# Patient Record
Sex: FEMALE | Race: WHITE | NOT HISPANIC OR LATINO | URBAN - METROPOLITAN AREA
[De-identification: names, ages, dates, MRNs, and addresses within clinical notes are randomized per-mention and may not be internally consistent; named-entity substitution may affect disease eponyms.]

---

## 2024-11-19 ENCOUNTER — LAB REQUISITION (OUTPATIENT)
Dept: DERMATOPATHOLOGY | Facility: CLINIC | Age: 79
End: 2024-11-19
Payer: MEDICARE

## 2024-11-19 DIAGNOSIS — D03.71 MELANOMA IN SITU OF RIGHT LOWER LIMB, INCLUDING HIP (MULTI): ICD-10-CM

## 2024-11-19 PROCEDURE — 88321 CONSLTJ&REPRT SLD PREP ELSWR: CPT | Performed by: DERMATOLOGY

## 2024-11-20 LAB
PATH REPORT.FINAL DX SPEC: NORMAL
PATH REPORT.GROSS SPEC: NORMAL
PATH REPORT.RELEVANT HX SPEC: NORMAL
PATH REPORT.TOTAL CANCER: NORMAL
PATHOLOGY SYNOPTIC REPORT: NORMAL

## 2024-11-24 DIAGNOSIS — C43.9 MELANOMA OF SKIN (MULTI): ICD-10-CM

## 2024-11-24 NOTE — TUMOR BOARD NOTE
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    Recommendation:  WLE with 5 mm margins.    Review Multidisciplinary Cutaneous Oncology Conference recommendation with patient.  Continue routine follow up and total body skin exams with Mayra Sewell.    Follow Up:  Albina Alejandra.      History and Physical Exam  Dermatologic History:   79 y.o. female with a biopsy of the right proximal medial calf on 11/19/2024 showing a melanoma in situ.    She is scheduled for a WLE on 12/18/2024 with Dr. Sewell.      Pathology  Derm Consult: [id number]  Order: [id number]   Collected 11/19/2024 12:19       Status: Final result       Visible to patient: No (inaccessible in [facility])       Dx: Melanoma in situ of right lower limb,...    0 Result Notes      Component    FINAL DIAGNOSIS   3 SLIDES, DERMPATH DIAGNOSTICS [facility], #[id number] (BX: 10/31/2024)     SKIN, RIGHT PROXIMAL MEDIAL CALF, SHAVE BIOPSY:  MELANOMA IN SITU AND DERMAL NEVUS, SEE NOTE.     Note: Microscopic examination reveals a specimen that extends into the superficial dermis. There is an asymmetric proliferation of nested and single melanocytes along the dermal-epidermal junction with pagetoid extension. These melanocytes have moderately enlarged nuclei with moderate to abundant cytoplasm. There are single and nested benign-appearing melanocytes in the dermis. The melanocytes stain with antibodies against SOX-10. Approximately fifty percent of the epidermal melanocytes stain moderately with antibodies against PRAME while the dermal melanocytes do not stain with antibodies against PRAME.      ** Electronically signed out by Lashanda Blackman MD **       Electronically signed by Lashanda Blackman MD on 11/20/2024 at 0749   Case Summary Report   MELANOMA OF THE SKIN: Biopsy   8th Edition - Protocol posted: 3/23/2022MELANOMA OF THE SKIN: BIOPSY - A  SPECIMEN   Procedure  Biopsy, shave   Specimen Laterality  Right   TUMOR   Tumor Site  Skin of lower limb and hip: Right proximal medial calf          Histologic Type  Melanoma in situ, superficial spreading type (low-cumulative sun damage (CSD) melanoma in situ)   Ulceration  Not identified   Tumor Regression  Not identified   MARGINS     Margin Status for Melanoma in Situ  Melanoma in situ present at margin   Margin(s) Involved by Melanoma in Situ  Peripheral     Deep   PATHOLOGIC STAGE CLASSIFICATION (pTNM, AJCC 8th Edition)     pT Category  pTis   ADDITIONAL FINDINGS   Additional Findings

## 2024-11-25 ENCOUNTER — TUMOR BOARD CONFERENCE (OUTPATIENT)
Dept: HEMATOLOGY/ONCOLOGY | Facility: HOSPITAL | Age: 79
End: 2024-11-25
Payer: MEDICARE

## 2024-12-18 ENCOUNTER — APPOINTMENT (OUTPATIENT)
Dept: DERMATOLOGY | Facility: CLINIC | Age: 79
End: 2024-12-18
Payer: MEDICARE

## 2024-12-18 DIAGNOSIS — D03.71: ICD-10-CM

## 2024-12-18 PROCEDURE — 99204 OFFICE O/P NEW MOD 45 MIN: CPT | Performed by: DERMATOLOGY

## 2024-12-18 PROCEDURE — 11602 EXC TR-EXT MAL+MARG 1.1-2 CM: CPT | Performed by: DERMATOLOGY

## 2024-12-18 PROCEDURE — 12032 INTMD RPR S/A/T/EXT 2.6-7.5: CPT | Performed by: DERMATOLOGY

## 2024-12-18 RX ORDER — HYDROGEN PEROXIDE 3 %
20 SOLUTION, NON-ORAL MISCELLANEOUS EVERY OTHER DAY
COMMUNITY
Start: 2024-11-15

## 2024-12-18 RX ORDER — PSYLLIUM HUSK 0.4 G
CAPSULE ORAL EVERY 12 HOURS
COMMUNITY

## 2024-12-18 RX ORDER — PREDNISOLONE ACETATE 10 MG/ML
1 SUSPENSION/ DROPS OPHTHALMIC 4 TIMES DAILY
COMMUNITY
Start: 2024-09-16

## 2024-12-18 RX ORDER — NAPROXEN SODIUM 220 MG/1
TABLET, FILM COATED ORAL EVERY 24 HOURS
COMMUNITY

## 2024-12-18 RX ORDER — ALBUTEROL SULFATE 90 UG/1
2 INHALANT RESPIRATORY (INHALATION) EVERY 4 HOURS PRN
COMMUNITY
Start: 2024-11-21

## 2024-12-18 RX ORDER — ATORVASTATIN CALCIUM 80 MG/1
80 TABLET, FILM COATED ORAL
COMMUNITY
Start: 2024-05-14

## 2024-12-18 RX ORDER — FAMOTIDINE 20 MG/1
20 TABLET, FILM COATED ORAL
COMMUNITY
Start: 2024-07-18

## 2024-12-18 RX ORDER — METFORMIN HYDROCHLORIDE 500 MG/1
1 TABLET, EXTENDED RELEASE ORAL
COMMUNITY
Start: 2024-05-14 | End: 2025-05-14

## 2024-12-18 RX ORDER — TURMERIC 400 MG
1 CAPSULE ORAL
COMMUNITY

## 2024-12-18 RX ORDER — LOSARTAN POTASSIUM AND HYDROCHLOROTHIAZIDE 12.5; 1 MG/1; MG/1
1 TABLET ORAL
COMMUNITY
Start: 2024-03-12 | End: 2025-03-12

## 2024-12-18 RX ORDER — NIFEDIPINE 30 MG/1
30 TABLET, EXTENDED RELEASE ORAL
COMMUNITY
Start: 2024-11-25 | End: 2025-05-24

## 2024-12-18 RX ORDER — FLUTICASONE FUROATE AND VILANTEROL 200; 25 UG/1; UG/1
1 POWDER RESPIRATORY (INHALATION)
COMMUNITY
Start: 2024-11-21

## 2024-12-18 NOTE — PROGRESS NOTES
Melanoma Excision Consult Note    Date of Surgery: 12/18/2024  Surgeon:  Mayra Sewell MD  Office Location:  [address] [facility]  [address]  [address]  [facility] [address]  Dept: [phone]  Dept Fax: [phone]   Referring Provider:Albina Cameron  [address]  [place] [address]  Phone: [phone]  Fax:       Subjective   Candis Garcia is a 79 y.o. female who presents for the following: Excision for melanoma.    According to the patient, the lesion has been present for approximately greater than 1 year at the time of diagnosis.  The lesion is not causing symptoms.  The lesion has not been treated previously.    The patient does not have a pacemaker / defibrillator.  The patient does not have a heart valve / joint replacement.  The patient is on blood thinners.    The following portions of the chart were reviewed this encounter and updated as appropriate:       Review of Systems:  No other skin or systemic complaints other than what is documented elsewhere in the note.    Medical History:  Clinically relevant history including significant past medical history, review of systems, medications and allergies was reviewed and is documented in Epic.    Objective   Well appearing patient in no apparent distress; mood and affect are within normal limits.  Vital signs: See record.  Noted on the Right Proximal Medial Calf  Is a 0.8 x 0.5 cm scar    The patient confirmed the identified site.    Discussion:  The nature of the diagnosis was explained. The lesion is an early melanoma but is likely to have been present for >1 year and is likely to progress without treatment. The multidisciplinary cutaneous oncology tumor board report was reviewed with the patient and surgery is recommended. The patient was informed that based on the depth and lack of ulceration, a sentinel lymph node biopsy is not indicated. However, the melanoma may be upstaged after  excision following histopathologic examination, which may require additional treatment. Warning signs of melanoma were discussed. We recommended that the patient have regular total body skin exams given increased risk of skin cancers. The patient was instructed to use sun protective behaviors including use of broad spectrum sunscreens and sun protective clothing to reduce the risk of skin cancers.     Surgery was recommended and discussed with the patient. The risks, benefits and potential adverse effects were reviewed and the patient voiced understanding. Discussion included but was not limited to the risks of bleeding and infection, likely scar outcome, possible need for revision surgery, cure rate, wound care requirements, activity restrictions and time to heal. Reconstruction options, risks and benefits were reviewed including second intention healing and linear repair (4-1 ratio was explained). It was explained that the scar would be longer than the original lesion.    Medical Decision Making:    Column 1 - chronic illness with progression  Column 2 - category 3: discussion of management with external physicians (multidisciplinary tumor board)  Column 3 - decision regarding minor surgery with identified risk factors (bleeding, infection, scarring)

## 2024-12-18 NOTE — PROGRESS NOTES
"Excision Operative Note    Date of Surgery:  12/18/2024  Surgeon:  Mayra Sewell MD  Office Location:  [address] [facility]  [address]  [address]  [facility] [address]  Dept: [phone]  Dept Fax: [phone]  Referring Provider: Albina Cameron  [address]  [place] [id number]  Phone: [phone]  Fax:     [facility]   Candis Garcia is a 79 y.o. female who presents for the following: Excision for melanoma.    According to the patient, the lesion has been present for approximately greater than 1 year at the time of diagnosis.  The lesion is not causing symptoms.  The lesion has not been treated previously.    The patient does not have a pacemaker / defibrillator.  The patient does not have a heart valve / joint replacement.    The patient is on blood thinners.   The patient does not have a history of hepatitis B or C.  The patient does not have a history of HIV.  The patient does have a history of immunosuppression (e.g. organ transplantation, malignancy, medications)      The following portions of the chart were reviewed this encounter and updated as appropriate:         Assessment/Plan   Pre-procedure:   Obtained informed consent: written from patient  The surgical site was identified and confirmed with the patient.     Intra-operative:   Audible time out called at : 3:00PM 12/18/24  by: Gina Romo RN   Verified patient name, birthdate, site, specimen bottle label & requisition.    The planned procedure(s) was again reviewed with the patient. The risks of bleeding, infection, nerve damage and scarring were reviewed. The patient identity, surgical site, and planned procedure(s) were verified.     Biopsy Accession Number: [id number] \"outside path\"  Melanoma in situ of right lower leg (Multi)  Right Proximal Medial Calf    Skin excision    Lesion length (cm):  0.8  Lesion width (cm):  0.5  Margin per side (cm):  0.5  Total excision diameter " (cm):  1.8  Informed consent: discussed and consent obtained    Timeout: patient name, date of birth, surgical site, and procedure verified    Procedure prep:  Patient was prepped and draped  Anesthesia: the lesion was anesthetized in a standard fashion    Anesthetic:  Lidocaine 2% with epinephrine  Instrument used: #15 blade    Hemostasis achieved with: electrodesiccation    Outcome: patient tolerated procedure well with no complications    Post-procedure details: sterile dressing applied and wound care instructions given    Dressing type: pressure dressing, petrolatum, Hypafix and Telfa pad    Additional details:  Melanoma Leigh:   Curative Intent: Yes  Original Breslow Thickness: MIS  Clinical margin width: 0.5 cm  Depth of excision: Full thickness     Skin repair  Complexity:  Intermediate  Final length (cm):  4  Informed consent: discussed and consent obtained    Timeout: patient name, date of birth, surgical site, and procedure verified    Procedure prep:  Patient prepped in sterile fashion  Anesthesia: the lesion was anesthetized in a standard fashion    Anesthetic:  Lidocaine 2% with epinephrine  Reason for type of repair: reduce tension to allow closure    Undermining: edges undermined    Subcutaneous layers (deep stitches):   Suture size:  3-0  Suture type: Vicryl (polyglactin 910)    Stitches:  Buried vertical mattress  Fine/surface layer approximation (top stitches):   Suture size:  5-0  Suture type: fast-absorbing plain gut    Stitches: simple running    Hemostasis achieved with: electrodesiccation  Outcome: patient tolerated procedure well with no complications    Post-procedure details: sterile dressing applied and wound care instructions given    Dressing type: pressure dressing      Specimen 1 - Dermatopathology- DERM LAB  Differential Diagnosis: MIS  Check Margins Yes  Comments:    Dermpath Lab: Routine Histopathology (formalin-fixed tissue)      Intermediate Linear Repair:  Given the location and  size of the defect, it was determined that an intermediate layered linear closure was required to restore normal anatomy and function. The repair is an intermediate closure as two layers of sutures were required. The defect was undermined extensively at the level of the subcutaneous plane. Standing cutaneous cones were removed using Burow's triangles. The wound edges were brought into close approximation with buried vertical mattress sutures. The remainder of the wound was then closed with epidermal top sutures.    The final repair measured 4.0 cm    Wound care was discussed, and the patient was given written post-operative wound care instructions.      The patient will follow up with Mayra Sewell MD as needed for any post operative problems or concerns, and will follow up with their primary dermatologist as scheduled.

## 2024-12-20 ENCOUNTER — TELEPHONE (OUTPATIENT)
Dept: DERMATOLOGY | Facility: CLINIC | Age: 79
End: 2024-12-20
Payer: MEDICARE

## 2024-12-20 LAB
LABORATORY COMMENT REPORT: NORMAL
PATH REPORT.FINAL DX SPEC: NORMAL
PATH REPORT.GROSS SPEC: NORMAL
PATH REPORT.MICROSCOPIC SPEC OTHER STN: NORMAL
PATH REPORT.RELEVANT HX SPEC: NORMAL
PATH REPORT.TOTAL CANCER: NORMAL

## 2024-12-20 NOTE — RESULT ENCOUNTER NOTE
Alejandro Garcia - your results show the skin cancer has been completely removed. No further treatment required. Continue skin checks every 3-4 months with your primary dermatologist. Please call/message us with any questions/concerns.

## 2024-12-20 NOTE — TELEPHONE ENCOUNTER
Surgical site: Right Proximal Medial Calf  Date of procedure 12/18/24    Patient informed about pathology result which showed clear margins. No further surgery needed but the patient was advised to follow up with general dermatology. The patient had no further concerns and was advised to call with any questions.

## 2025-02-24 ENCOUNTER — TUMOR BOARD CONFERENCE (OUTPATIENT)
Dept: HEMATOLOGY/ONCOLOGY | Facility: HOSPITAL | Age: 80
End: 2025-02-24
Payer: MEDICARE

## 2025-02-24 DIAGNOSIS — C43.9 MELANOMA OF SKIN (MULTI): ICD-10-CM

## 2025-02-24 NOTE — TUMOR BOARD NOTE
General Patient Information  Name:  Candis Garcia  Evaluation #:  2  Conference Date:  2/24/2025  YOB: 1945  MRN:  [id number]  Program Physician(s):  Julius Cisneros  Referring Physician(s):  Mayra Parikh      Summary   Stage:  0 ([id number])    Assessment:  Melanoma in situ of the right proximal medial calf. S/p WLE with 5 mm margins. Adequately surgically treated.    Recommendation:  Annual H&P.    Review Multidisciplinary Cutaneous Oncology Conference recommendation with patient.  Continue routine follow up and total body skin exams with Mayra Sewell.    Follow Up:  Mayra Sewell      History and Physical Exam  Dermatologic History:   79 y.o. female with a biopsy of the right proximal medial calf on 11/19/2024 showing a melanoma in situ. She underwent a WLE with 5 mm margins on 12/18/2024 which showed changes consistent with previous procedure, inked margins free in the planes of sections examined without residual atypical melanocytic neoplasm seen.      Pathology  Dermatopathology- DERM LAB: [id number]  Order: [id number]   Collected 12/18/2024 13:52       Status: Final result       Visible to patient: Yes (not seen)       Dx: Melanoma in situ of right lower leg (...    1 Result Note       1 Patient Communication      Component    FINAL DIAGNOSIS   SKIN, RIGHT PROXIMAL MEDIAL CALF, EXCISION:  CHANGES CONSISTENT WITH PREVIOUS PROCEDURE, INKED MARGINS FREE IN THE PLANES OF SECTIONS EXAMINED, WITHOUT RESIDUAL ATYPICAL MELANOCYTIC NEOPLASM SEEN.     ** Electronically signed out by Lashanda Blackman MD **             Derm Consult: [id number]  Order: [id number]   Collected 11/19/2024 12:19       Status: Final result       Visible to patient: No (inaccessible in [facility])       Dx: Melanoma in situ of right lower limb,...    0 Result Notes      Component    FINAL DIAGNOSIS   3 SLIDES, DERMPATH DIAGNOSTICS [facility], #[id number] (BX: 10/31/2024)     SKIN, RIGHT PROXIMAL MEDIAL CALF,  SHAVE BIOPSY:  MELANOMA IN SITU AND DERMAL NEVUS, SEE NOTE.     Note: Microscopic examination reveals a specimen that extends into the superficial dermis. There is an asymmetric proliferation of nested and single melanocytes along the dermal-epidermal junction with pagetoid extension. These melanocytes have moderately enlarged nuclei with moderate to abundant cytoplasm. There are single and nested benign-appearing melanocytes in the dermis. The melanocytes stain with antibodies against SOX-10. Approximately fifty percent of the epidermal melanocytes stain moderately with antibodies against PRAME while the dermal melanocytes do not stain with antibodies against PRAME.      ** Electronically signed out by Lashanda Blackman MD **      Electronically signed by Lashanda Blackman MD on 11/20/2024 at 0749   Case Summary Report   MELANOMA OF THE SKIN: Biopsy   8th Edition - Protocol posted: 3/23/2022MELANOMA OF THE SKIN: BIOPSY - A  SPECIMEN   Procedure  Biopsy, shave   Specimen Laterality  Right   TUMOR   Tumor Site  Skin of lower limb and hip: Right proximal medial calf          Histologic Type  Melanoma in situ, superficial spreading type (low-cumulative sun damage (CSD) melanoma in situ)   Ulceration  Not identified   Tumor Regression  Not identified   MARGINS     Margin Status for Melanoma in Situ  Melanoma in situ present at margin   Margin(s) Involved by Melanoma in Situ  Peripheral     Deep   PATHOLOGIC STAGE CLASSIFICATION (pTNM, AJCC 8th Edition)     pT Category  pTis   ADDITIONAL FINDINGS   Additional Findings